# Patient Record
Sex: FEMALE | Race: WHITE | URBAN - NONMETROPOLITAN AREA
[De-identification: names, ages, dates, MRNs, and addresses within clinical notes are randomized per-mention and may not be internally consistent; named-entity substitution may affect disease eponyms.]

---

## 2018-01-15 ENCOUNTER — NURSE TRIAGE (OUTPATIENT)
Dept: ADMINISTRATIVE | Age: 78
End: 2018-01-15

## 2018-01-16 NOTE — TELEPHONE ENCOUNTER
Reason for Disposition   [4] Systolic BP < 80 AND [1] NOT dizzy, lightheaded or weak    Answer Assessment - Initial Assessment Questions  1. BLOOD PRESSURE: \"What is the blood pressure? \" \"Did you take at least two measurements 5 minutes apart? \"      86/41 and 76/42  2. ONSET: \"When did you take your blood pressure? \"      All day today   3. HOW: \"How did you obtain the blood pressure? \" (e.g., visiting nurse, automatic home BP monitor)      Home monitor   4. HISTORY: \"Do you have a history of low blood pressure? \" What is your blood pressure normally? HTN   5. MEDICATIONS: Rebecca Signs you taking any medications for blood pressure? \" If yes: \"Have they been changed recently? \"      Yes   6. PULSE RATE: \"Do you know what your pulse rate is?\"       66 and 63.   7. OTHER SYMPTOMS: \"Have you been sick recently? \" Have you had a recent injury? Feeling dizzy and felt like she was going to pass our earlier today. She has to stay laying down so she isn't as dizzy. 8. PREGNANCY: \"Is there any chance you are pregnant? \" \"When was your last menstrual period? \"  Male    Protocols used: LOW BLOOD PRESSURE-ADULT-